# Patient Record
Sex: FEMALE | Race: WHITE | NOT HISPANIC OR LATINO | URBAN - METROPOLITAN AREA
[De-identification: names, ages, dates, MRNs, and addresses within clinical notes are randomized per-mention and may not be internally consistent; named-entity substitution may affect disease eponyms.]

---

## 2017-10-03 ENCOUNTER — OUTPATIENT (OUTPATIENT)
Dept: OUTPATIENT SERVICES | Facility: HOSPITAL | Age: 47
LOS: 1 days | Discharge: HOME | End: 2017-10-03

## 2017-10-03 DIAGNOSIS — R92.2 INCONCLUSIVE MAMMOGRAM: ICD-10-CM

## 2017-10-03 DIAGNOSIS — R92.8 OTHER ABNORMAL AND INCONCLUSIVE FINDINGS ON DIAGNOSTIC IMAGING OF BREAST: ICD-10-CM

## 2017-10-03 DIAGNOSIS — Z12.31 ENCOUNTER FOR SCREENING MAMMOGRAM FOR MALIGNANT NEOPLASM OF BREAST: ICD-10-CM

## 2018-11-12 ENCOUNTER — OUTPATIENT (OUTPATIENT)
Dept: OUTPATIENT SERVICES | Facility: HOSPITAL | Age: 48
LOS: 1 days | Discharge: HOME | End: 2018-11-12

## 2018-11-12 DIAGNOSIS — Z12.31 ENCOUNTER FOR SCREENING MAMMOGRAM FOR MALIGNANT NEOPLASM OF BREAST: ICD-10-CM

## 2019-11-25 PROBLEM — Z00.00 ENCOUNTER FOR PREVENTIVE HEALTH EXAMINATION: Status: ACTIVE | Noted: 2019-11-25

## 2020-01-15 ENCOUNTER — APPOINTMENT (OUTPATIENT)
Dept: OBGYN | Facility: CLINIC | Age: 50
End: 2020-01-15
Payer: COMMERCIAL

## 2020-01-15 VITALS
WEIGHT: 190 LBS | HEART RATE: 63 BPM | BODY MASS INDEX: 28.79 KG/M2 | HEIGHT: 68 IN | SYSTOLIC BLOOD PRESSURE: 136 MMHG | DIASTOLIC BLOOD PRESSURE: 91 MMHG

## 2020-01-15 DIAGNOSIS — Z80.0 FAMILY HISTORY OF MALIGNANT NEOPLASM OF DIGESTIVE ORGANS: ICD-10-CM

## 2020-01-15 PROCEDURE — 99396 PREV VISIT EST AGE 40-64: CPT

## 2020-01-15 NOTE — PHYSICAL EXAM
[Awake] : awake [Alert] : alert [Acute Distress] : no acute distress [Mass] : no breast mass [Nipple Discharge] : no nipple discharge [Axillary LAD] : no axillary lymphadenopathy [Tender] : non tender [Soft] : soft [Oriented x3] : oriented to person, place, and time [Normal] : uterus [Cystocele] : a cystocele [Rectocele] : a rectocele [No Bleeding] : there was no active vaginal bleeding [Uterine Adnexae] : were not tender and not enlarged [External Hemorrhoid] : an external hemorrhoid [No Tenderness] : no rectal tenderness

## 2020-01-15 NOTE — HISTORY OF PRESENT ILLNESS
[Definite:  ___ (Date)] : the last menstrual period was [unfilled] [Normal Amount/Duration] : was of a normal amount and duration [Regular Cycle Intervals] : periods have been regular [Menarche Age: ____] : age at menarche was [unfilled] [Sexually Active] : is sexually active [Contraception] : uses contraception [Male ___] : [unfilled] male [NA] : N/A [Spotting Between  Menses] : no spotting between menses

## 2020-01-15 NOTE — CHIEF COMPLAINT
[Annual Visit] : annual visit [FreeTextEntry1] : Patient presents today for her Annual Exam. No complaints.\par \par Last Pap: 2017\par Mammogram: 2017

## 2020-01-21 LAB — HPV HIGH+LOW RISK DNA PNL CVX: NOT DETECTED

## 2020-09-11 ENCOUNTER — RESULT REVIEW (OUTPATIENT)
Age: 50
End: 2020-09-11

## 2020-09-11 ENCOUNTER — OUTPATIENT (OUTPATIENT)
Dept: OUTPATIENT SERVICES | Facility: HOSPITAL | Age: 50
LOS: 1 days | Discharge: HOME | End: 2020-09-11
Payer: COMMERCIAL

## 2020-09-11 DIAGNOSIS — Z12.31 ENCOUNTER FOR SCREENING MAMMOGRAM FOR MALIGNANT NEOPLASM OF BREAST: ICD-10-CM

## 2020-09-11 PROCEDURE — 77067 SCR MAMMO BI INCL CAD: CPT | Mod: 26

## 2020-09-11 PROCEDURE — 77063 BREAST TOMOSYNTHESIS BI: CPT | Mod: 26

## 2021-08-20 ENCOUNTER — TRANSCRIPTION ENCOUNTER (OUTPATIENT)
Age: 51
End: 2021-08-20

## 2022-11-14 ENCOUNTER — APPOINTMENT (OUTPATIENT)
Dept: OBGYN | Facility: CLINIC | Age: 52
End: 2022-11-14
Payer: COMMERCIAL

## 2022-11-14 VITALS
SYSTOLIC BLOOD PRESSURE: 126 MMHG | DIASTOLIC BLOOD PRESSURE: 82 MMHG | HEIGHT: 68 IN | TEMPERATURE: 97.7 F | BODY MASS INDEX: 32.13 KG/M2 | HEART RATE: 71 BPM | WEIGHT: 212 LBS

## 2022-11-14 DIAGNOSIS — Z71.83 ENCOUNTER FOR NONPROCREATIVE GENETIC COUNSELING: ICD-10-CM

## 2022-11-14 DIAGNOSIS — Z80.3 FAMILY HISTORY OF MALIGNANT NEOPLASM OF BREAST: ICD-10-CM

## 2022-11-14 DIAGNOSIS — Z80.42 FAMILY HISTORY OF MALIGNANT NEOPLASM OF PROSTATE: ICD-10-CM

## 2022-11-14 DIAGNOSIS — Z13.71 ENCOUNTER FOR NONPROCREATIVE GENETIC COUNSELING: ICD-10-CM

## 2022-11-14 DIAGNOSIS — Z80.7 FAMILY HISTORY OF OTHER MALIGNANT NEOPLASMS OF LYMPHOID, HEMATOPOIETIC AND RELATED TISSUES: ICD-10-CM

## 2022-11-14 DIAGNOSIS — Z83.3 FAMILY HISTORY OF DIABETES MELLITUS: ICD-10-CM

## 2022-11-14 PROCEDURE — 99396 PREV VISIT EST AGE 40-64: CPT

## 2022-11-17 LAB
C TRACH RRNA SPEC QL NAA+PROBE: NOT DETECTED
HPV HIGH+LOW RISK DNA PNL CVX: NOT DETECTED
N GONORRHOEA RRNA SPEC QL NAA+PROBE: NOT DETECTED
SOURCE AMPLIFICATION: NORMAL
SOURCE AMPLIFICATION: NORMAL
T VAGINALIS RRNA SPEC QL NAA+PROBE: NOT DETECTED

## 2022-11-23 LAB — CYTOLOGY CVX/VAG DOC THIN PREP: NORMAL

## 2022-12-07 ENCOUNTER — APPOINTMENT (OUTPATIENT)
Dept: OBGYN | Facility: CLINIC | Age: 52
End: 2022-12-07

## 2022-12-07 DIAGNOSIS — N64.89 OTHER SPECIFIED DISORDERS OF BREAST: ICD-10-CM

## 2022-12-09 ENCOUNTER — RESULT REVIEW (OUTPATIENT)
Age: 52
End: 2022-12-09

## 2022-12-09 ENCOUNTER — OUTPATIENT (OUTPATIENT)
Dept: OUTPATIENT SERVICES | Facility: HOSPITAL | Age: 52
LOS: 1 days | Discharge: HOME | End: 2022-12-09

## 2022-12-09 DIAGNOSIS — Z12.31 ENCOUNTER FOR SCREENING MAMMOGRAM FOR MALIGNANT NEOPLASM OF BREAST: ICD-10-CM

## 2022-12-09 PROCEDURE — 77067 SCR MAMMO BI INCL CAD: CPT | Mod: 26

## 2022-12-09 PROCEDURE — 77063 BREAST TOMOSYNTHESIS BI: CPT | Mod: 26

## 2022-12-12 ENCOUNTER — TRANSCRIPTION ENCOUNTER (OUTPATIENT)
Age: 52
End: 2022-12-12

## 2022-12-12 PROBLEM — N64.89 BREAST ASYMMETRY: Status: ACTIVE | Noted: 2022-12-12

## 2022-12-14 ENCOUNTER — RESULT REVIEW (OUTPATIENT)
Age: 52
End: 2022-12-14

## 2022-12-14 ENCOUNTER — OUTPATIENT (OUTPATIENT)
Dept: OUTPATIENT SERVICES | Facility: HOSPITAL | Age: 52
LOS: 1 days | Discharge: HOME | End: 2022-12-14

## 2022-12-14 DIAGNOSIS — R92.8 OTHER ABNORMAL AND INCONCLUSIVE FINDINGS ON DIAGNOSTIC IMAGING OF BREAST: ICD-10-CM

## 2022-12-14 PROCEDURE — 76641 ULTRASOUND BREAST COMPLETE: CPT | Mod: 26,50

## 2022-12-14 PROCEDURE — 77065 DX MAMMO INCL CAD UNI: CPT | Mod: 26,LT

## 2022-12-14 PROCEDURE — G0279: CPT | Mod: 26

## 2023-01-18 PROBLEM — Z80.3 FAMILY HISTORY OF MALIGNANT NEOPLASM OF BREAST: Status: ACTIVE | Noted: 2020-01-15

## 2023-01-19 PROBLEM — Z71.83 ENCOUNTER FOR NONPROCREATIVE GENETIC COUNSELING AND TESTING: Status: ACTIVE | Noted: 2023-01-19

## 2023-01-19 PROBLEM — Z80.42 FAMILY HISTORY OF MALIGNANT NEOPLASM OF PROSTATE: Status: ACTIVE | Noted: 2023-01-18

## 2023-01-19 PROBLEM — Z83.3 FAMILY HISTORY OF DIABETES MELLITUS: Status: ACTIVE | Noted: 2023-01-18

## 2023-01-19 PROBLEM — Z80.7 FAMILY HISTORY OF NON-HODGKIN'S LYMPHOMA: Status: ACTIVE | Noted: 2023-01-18

## 2023-01-19 RX ORDER — PSYLLIUM HUSK 0.4 G
CAPSULE ORAL
Refills: 0 | Status: ACTIVE | COMMUNITY

## 2023-01-19 NOTE — HISTORY OF PRESENT ILLNESS
[Patient reported mammogram was normal] : Patient reported mammogram was normal [Patient reported PAP Smear was normal] : Patient reported PAP Smear was normal [perimenopausal] : perimenopausal [Irregular Cycle Intervals] : are  irregular [Y] : Positive pregnancy history [Normal Amount/Duration] :  normal amount and duration [Menarche Age: ____] : age at menarche was [unfilled] [Experiencing Menopausal Sxs] : experiencing menopausal symptoms [Currently Active] : currently active [Men] : men [No] : No [Yes] : Yes [Gonorrhea test offered] : Gonorrhea test offered [Chlamydia test offered] : Chlamydia test offered [Trichomonas test offered] : Trichomonas test offered [HPV test offered] : HPV test offered [TextBox_4] : 52-year-old para 3-0-0-3 with LMP 10/31/2022 came for annual GYN exam and Pap smear.  She denies abnormal uterine bleeding, pelvic pain, discharge, dysuria or other GYN complaints.  She states her menses comes about every 3 months but is not abnormal in consistency and she presumes it is related to perimenopause.  She denies history of abnormal Pap, HPV, STDs, fibroids or ovarian cysts.  She is sexually active with 1 male partner- and had E sure contraception placed many years ago.\par \par Upon evaluation of patient's history she was noted to have multiple possible hereditary cancers in her family including breast, pancreas, and prostate.  We discussed hereditary cancer gene testing and after counseling patient states she would like to do testing today.  Her sister had breast cancer and states she was BRCA negative but is unsure of all genes were tested. [Mammogramdate] : 2020 [TextBox_19] : Patient states has referral [PapSmeardate] : 2020 [ColonoscopyDate] : Never [TextBox_43] : Has GI consultation appointment scheduled [LMPDate] : 10/31/22 [PGHxTotal] : 3 [[facility]] : 3 [PGHxPremature] : 0 [PGHxAbortions] : 0 [[facility]] : 3 [FreeTextEntry1] : 10/31/22 [FreeTextEntry3] : Essure

## 2023-01-19 NOTE — DISCUSSION/SUMMARY
[FreeTextEntry1] : A: 52-year-old for annual GYN exam, perimenopause, vaginal cyst, BMI 32\par \par P: GYN exam done\par Pap smear done\par Safe sex practices\par Pain and bleeding precautions\par Patient counseled on vaginal cyst and will get pelvic ultrasound but also counseled on potential control of diverticulum and offered urogynecology referral which she declines at this time but will monitor for any symptoms\par Recommend yearly breast imaging and patient has referral\par Hereditary cancer gene testing-empower test sent\par Follow-up with gastroenterology as scheduled\par Encourage healthy diet and exercise\par Follow-up after imaging or as needed

## 2023-01-19 NOTE — PHYSICAL EXAM
[Appropriately responsive] : appropriately responsive [Alert] : alert [No Acute Distress] : no acute distress [No Lymphadenopathy] : no lymphadenopathy [Regular Rate Rhythm] : regular rate rhythm [Soft] : soft [Non-tender] : non-tender [Non-distended] : non-distended [No Mass] : no mass [Oriented x3] : oriented x3 [Examination Of The Breasts] : a normal appearance [No Discharge] : no discharge [No Masses] : no breast masses were palpable [No Lesions] : no lesions  [Labia Majora] : normal [Labia Minora] : normal [Normal] : normal [No Bleeding] : There was no active vaginal bleeding [Tenderness] : nontender [Enlarged ___ wks] : not enlarged [Mass ___ cm] : no uterine mass was palpated [Uterine Adnexae] : normal [FreeTextEntry4] : 3 cm simple cyst noted in the anterior vagina near introitus suburethral, mobile, nontender, no erosion or evidence of infection, upon manipulation there is no leaking of urine [FreeTextEntry5] : Pap smear done, small nabothian cyst noted [FreeTextEntry6] : Exam limited to habitus

## 2023-04-28 ENCOUNTER — NON-APPOINTMENT (OUTPATIENT)
Age: 53
End: 2023-04-28

## 2023-12-18 ENCOUNTER — APPOINTMENT (OUTPATIENT)
Dept: OBGYN | Facility: CLINIC | Age: 53
End: 2023-12-18
Payer: COMMERCIAL

## 2023-12-18 VITALS
HEIGHT: 68 IN | HEART RATE: 74 BPM | TEMPERATURE: 98.6 F | WEIGHT: 190 LBS | SYSTOLIC BLOOD PRESSURE: 123 MMHG | BODY MASS INDEX: 28.79 KG/M2 | DIASTOLIC BLOOD PRESSURE: 89 MMHG

## 2023-12-18 DIAGNOSIS — E66.3 OVERWEIGHT: ICD-10-CM

## 2023-12-18 DIAGNOSIS — Z15.01 GENETIC SUSCEPTIBILITY TO OTHER DISEASE: ICD-10-CM

## 2023-12-18 DIAGNOSIS — Z01.419 ENCOUNTER FOR GYNECOLOGICAL EXAMINATION (GENERAL) (ROUTINE) W/OUT ABNORMAL FINDINGS: ICD-10-CM

## 2023-12-18 DIAGNOSIS — N95.1 MENOPAUSAL AND FEMALE CLIMACTERIC STATES: ICD-10-CM

## 2023-12-18 DIAGNOSIS — Z12.4 ENCOUNTER FOR SCREENING FOR MALIGNANT NEOPLASM OF CERVIX: ICD-10-CM

## 2023-12-18 DIAGNOSIS — Z15.09 GENETIC SUSCEPTIBILITY TO OTHER DISEASE: ICD-10-CM

## 2023-12-18 DIAGNOSIS — N89.8 OTHER SPECIFIED NONINFLAMMATORY DISORDERS OF VAGINA: ICD-10-CM

## 2023-12-18 DIAGNOSIS — Z86.010 PERSONAL HISTORY OF COLONIC POLYPS: ICD-10-CM

## 2023-12-18 DIAGNOSIS — Z15.89 GENETIC SUSCEPTIBILITY TO OTHER DISEASE: ICD-10-CM

## 2023-12-18 DIAGNOSIS — R19.00 INTRA-ABDOMINAL AND PELVIC SWELLING, MASS AND LUMP, UNSPECIFIED SITE: ICD-10-CM

## 2023-12-18 PROCEDURE — 99396 PREV VISIT EST AGE 40-64: CPT | Mod: 25

## 2023-12-18 PROCEDURE — 99213 OFFICE O/P EST LOW 20 MIN: CPT | Mod: 25

## 2023-12-20 LAB
C TRACH RRNA SPEC QL NAA+PROBE: NOT DETECTED
N GONORRHOEA RRNA SPEC QL NAA+PROBE: NOT DETECTED
SOURCE AMPLIFICATION: NORMAL
SOURCE AMPLIFICATION: NORMAL
T VAGINALIS RRNA SPEC QL NAA+PROBE: NOT DETECTED

## 2023-12-21 LAB — HPV HIGH+LOW RISK DNA PNL CVX: NOT DETECTED

## 2024-01-01 PROBLEM — N95.1 PERIMENOPAUSE: Status: ACTIVE | Noted: 2023-01-19

## 2024-01-01 PROBLEM — Z86.010 HISTORY OF COLONIC POLYPS: Status: RESOLVED | Noted: 2023-12-18 | Resolved: 2024-01-01

## 2024-01-01 PROBLEM — R19.00 PELVIC FULLNESS: Status: ACTIVE | Noted: 2024-01-01

## 2024-01-01 PROBLEM — N89.8 VAGINAL CYST: Status: ACTIVE | Noted: 2023-01-19

## 2024-01-01 PROBLEM — Z12.4 ENCOUNTER FOR SCREENING FOR CERVICAL CANCER: Status: ACTIVE | Noted: 2022-11-14

## 2024-01-01 PROBLEM — Z15.89 MONOALLELIC MUTATION OF ATM GENE: Status: RESOLVED | Noted: 2024-01-01 | Resolved: 2024-01-01

## 2024-01-01 PROBLEM — E66.3 OVERWEIGHT: Status: ACTIVE | Noted: 2023-01-19

## 2024-01-01 NOTE — PHYSICAL EXAM
[Chaperone Declined] : Patient declined chaperone [Appropriately responsive] : appropriately responsive [Alert] : alert [No Acute Distress] : no acute distress [No Lymphadenopathy] : no lymphadenopathy [Regular Rate Rhythm] : regular rate rhythm [Soft] : soft [Non-tender] : non-tender [Non-distended] : non-distended [No Mass] : no mass [Oriented x3] : oriented x3 [Examination Of The Breasts] : a normal appearance [No Discharge] : no discharge [No Masses] : no breast masses were palpable [No Lesions] : no lesions  [Labia Majora] : normal [Labia Minora] : normal [Normal] : normal [No Bleeding] : There was no active vaginal bleeding [Uterine Adnexae] : normal  [Nabothian Cyst ___ cm] : [unfilled] ~Ucm nabothian cyst [Tenderness] : nontender [Enlarged ___ wks] : not enlarged [Mass ___ cm] : no uterine mass was palpated [FreeTextEntry4] : 2-3sm simple vaginal cyst as previously noted anterior wall at introitus- no change, nontender non suspicious [FreeTextEntry5] : pap smear done [FreeTextEntry6] : exam limited to habitus, there is right pelvic fullness noted on bimanual exam, no distinct masses

## 2024-01-01 NOTE — DISCUSSION/SUMMARY
[FreeTextEntry1] : A: 52yo for annual exam, perimenopause, vaginal cyst, SVETLANA gene mutation, pelvic fullness, BMI 28  P: GYN exam done    pap smear done     safe sex practices     pain and bleeding precautions      referral for pelvic sonogram given      vaginal cyst options cousneled and patient would like to continue with conservative observation while asx       SVETLANA gene mutation and recommended screening counseled- will try to get open MRI if possible of breasts or get auth for diagnostic mammo/sono of breasts for screening purposes- f/u with GI as recommended      encourage healthy diet and exercise      f/u after tests and imaging resulted or PRN

## 2024-01-01 NOTE — HISTORY OF PRESENT ILLNESS
[Patient reported mammogram was normal] : Patient reported mammogram was normal [Patient reported PAP Smear was normal] : Patient reported PAP Smear was normal [Patient reported colonoscopy was abnormal] : Patient reported colonoscopy was abnormal [perimenopausal] : perimenopausal [N] : Patient does not use contraception [Normal Amount/Duration] :  normal amount and duration [Menarche Age: ____] : age at menarche was [unfilled] [Experiencing Menopausal Sxs] : experiencing menopausal symptoms [Currently Active] : currently active [Men] : men [No] : No [Yes] : Yes [Gonorrhea test offered] : Gonorrhea test offered [Chlamydia test offered] : Chlamydia test offered [Trichomonas test offered] : Trichomonas test offered [HPV test offered] : HPV test offered [Tubal Occlusion] : has had a tubal occlusion [Y] : Patient is sexually active [TextBox_4] : 89year-old para 3-0-0-3 with LMP 2/2023 came for annual GYN exam and Pap smear. She denies abnormal uterine bleeding, pelvic pain, discharge, dysuria or other GYN complaints. 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  She is unable to do MRI recommended for breast imaging due to anxiety/claustrophobia.  We discussed possibly attempting open MRI if available or diagnostic breast imaging with sonogram at the least.  She is amenable to this evaluation plan.  She denies history of abnormal Pap, HPV, STDs, fibroids or ovarian cysts. She is sexually active with 1 male partner- and had E sure contraception placed many years ago. [Mammogramdate] : 2022 [TextBox_19] : will try to get auth for open MRI or dx imaging [TextBox_25] : will give referral [PapSmeardate] : 11/2022 [ColonoscopyDate] : 2023 [TextBox_43] : polyps- benign f/u 2 yrs  [LMPDate] : 2/2023 [PGHxTotal] : 3 [[facility]] : 3 [PGHxPremature] : 0 [PGHxAbortions] : 0 [[facility]] : 3 [FreeTextEntry1] : 2/2023 [FreeTextEntry3] : Essure

## 2024-01-02 DIAGNOSIS — N76.0 ACUTE VAGINITIS: ICD-10-CM

## 2024-01-02 DIAGNOSIS — B96.89 ACUTE VAGINITIS: ICD-10-CM

## 2024-01-02 LAB — CYTOLOGY CVX/VAG DOC THIN PREP: ABNORMAL

## 2024-01-02 RX ORDER — METRONIDAZOLE 7.5 MG/G
0.75 GEL VAGINAL
Qty: 1 | Refills: 0 | Status: ACTIVE | COMMUNITY
Start: 2024-01-02

## 2024-01-09 DIAGNOSIS — Z15.89 GENETIC SUSCEPTIBILITY TO OTHER DISEASE: ICD-10-CM

## 2024-02-20 ENCOUNTER — RESULT REVIEW (OUTPATIENT)
Age: 54
End: 2024-02-20

## 2024-02-20 ENCOUNTER — OUTPATIENT (OUTPATIENT)
Dept: OUTPATIENT SERVICES | Facility: HOSPITAL | Age: 54
LOS: 1 days | End: 2024-02-20
Payer: COMMERCIAL

## 2024-02-20 DIAGNOSIS — Z80.3 FAMILY HISTORY OF MALIGNANT NEOPLASM OF BREAST: ICD-10-CM

## 2024-02-20 PROCEDURE — 76641 ULTRASOUND BREAST COMPLETE: CPT | Mod: 26,50

## 2024-02-20 PROCEDURE — 77063 BREAST TOMOSYNTHESIS BI: CPT

## 2024-02-20 PROCEDURE — 76641 ULTRASOUND BREAST COMPLETE: CPT | Mod: 50

## 2024-02-20 PROCEDURE — 77067 SCR MAMMO BI INCL CAD: CPT | Mod: 26

## 2024-02-20 PROCEDURE — 77063 BREAST TOMOSYNTHESIS BI: CPT | Mod: 26

## 2024-02-20 PROCEDURE — 77067 SCR MAMMO BI INCL CAD: CPT

## 2024-02-21 DIAGNOSIS — Z80.3 FAMILY HISTORY OF MALIGNANT NEOPLASM OF BREAST: ICD-10-CM

## 2024-06-10 ENCOUNTER — APPOINTMENT (OUTPATIENT)
Dept: OBGYN | Facility: CLINIC | Age: 54
End: 2024-06-10

## 2024-12-23 ENCOUNTER — APPOINTMENT (OUTPATIENT)
Dept: OBGYN | Facility: CLINIC | Age: 54
End: 2024-12-23

## 2024-12-23 ENCOUNTER — NON-APPOINTMENT (OUTPATIENT)
Age: 54
End: 2024-12-23

## 2024-12-23 VITALS
WEIGHT: 190 LBS | DIASTOLIC BLOOD PRESSURE: 88 MMHG | HEIGHT: 68 IN | BODY MASS INDEX: 28.79 KG/M2 | SYSTOLIC BLOOD PRESSURE: 135 MMHG | TEMPERATURE: 98.6 F | HEART RATE: 68 BPM

## 2024-12-23 DIAGNOSIS — Z01.419 ENCOUNTER FOR GYNECOLOGICAL EXAMINATION (GENERAL) (ROUTINE) W/OUT ABNORMAL FINDINGS: ICD-10-CM

## 2024-12-23 DIAGNOSIS — N95.0 POSTMENOPAUSAL BLEEDING: ICD-10-CM

## 2024-12-23 PROCEDURE — 99396 PREV VISIT EST AGE 40-64: CPT | Mod: 25

## 2024-12-23 PROCEDURE — 99213 OFFICE O/P EST LOW 20 MIN: CPT | Mod: 25

## 2024-12-26 ENCOUNTER — RESULT REVIEW (OUTPATIENT)
Age: 54
End: 2024-12-26

## 2024-12-26 ENCOUNTER — OUTPATIENT (OUTPATIENT)
Dept: OUTPATIENT SERVICES | Facility: HOSPITAL | Age: 54
LOS: 1 days | End: 2024-12-26
Payer: COMMERCIAL

## 2024-12-26 DIAGNOSIS — N95.0 POSTMENOPAUSAL BLEEDING: ICD-10-CM

## 2024-12-26 DIAGNOSIS — Z00.8 ENCOUNTER FOR OTHER GENERAL EXAMINATION: ICD-10-CM

## 2024-12-26 PROCEDURE — 76856 US EXAM PELVIC COMPLETE: CPT | Mod: 26

## 2024-12-26 PROCEDURE — 76830 TRANSVAGINAL US NON-OB: CPT | Mod: 26

## 2024-12-26 PROCEDURE — 76830 TRANSVAGINAL US NON-OB: CPT

## 2024-12-26 PROCEDURE — 76856 US EXAM PELVIC COMPLETE: CPT

## 2024-12-27 DIAGNOSIS — N95.0 POSTMENOPAUSAL BLEEDING: ICD-10-CM

## 2025-01-03 LAB — CYTOLOGY CVX/VAG DOC THIN PREP: NORMAL

## 2025-01-06 ENCOUNTER — NON-APPOINTMENT (OUTPATIENT)
Age: 55
End: 2025-01-06